# Patient Record
Sex: FEMALE | URBAN - METROPOLITAN AREA
[De-identification: names, ages, dates, MRNs, and addresses within clinical notes are randomized per-mention and may not be internally consistent; named-entity substitution may affect disease eponyms.]

---

## 2018-03-09 ENCOUNTER — RESOLUTE PROFESSIONAL BILLING HOSPITAL PROF FEE (OUTPATIENT)
Dept: HOSPITALIST | Facility: MEDICAL CENTER | Age: 48
End: 2018-03-09
Payer: MEDICAID

## 2018-03-09 ENCOUNTER — APPOINTMENT (OUTPATIENT)
Dept: RADIOLOGY | Facility: MEDICAL CENTER | Age: 48
DRG: 605 | End: 2018-03-09
Attending: EMERGENCY MEDICINE
Payer: MEDICAID

## 2018-03-09 ENCOUNTER — HOSPITAL ENCOUNTER (INPATIENT)
Facility: MEDICAL CENTER | Age: 48
LOS: 1 days | DRG: 605 | End: 2018-03-10
Attending: EMERGENCY MEDICINE | Admitting: HOSPITALIST
Payer: MEDICAID
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
## 2018-03-09 DIAGNOSIS — L03.113 CELLULITIS OF HAND, RIGHT: ICD-10-CM

## 2018-03-09 DIAGNOSIS — S61.451A DOG BITE OF RIGHT HAND, INITIAL ENCOUNTER: ICD-10-CM

## 2018-03-09 LAB
ALBUMIN SERPL BCP-MCNC: 3.7 G/DL (ref 3.2–4.9)
ALBUMIN/GLOB SERPL: 1.5 G/DL
ALP SERPL-CCNC: 38 U/L (ref 30–99)
ALT SERPL-CCNC: 8 U/L (ref 2–50)
ANION GAP SERPL CALC-SCNC: 8 MMOL/L (ref 0–11.9)
AST SERPL-CCNC: 14 U/L (ref 12–45)
BASOPHILS # BLD AUTO: 0.3 % (ref 0–1.8)
BASOPHILS # BLD: 0.03 K/UL (ref 0–0.12)
BILIRUB SERPL-MCNC: 0.2 MG/DL (ref 0.1–1.5)
BUN SERPL-MCNC: 11 MG/DL (ref 8–22)
CALCIUM SERPL-MCNC: 8.8 MG/DL (ref 8.5–10.5)
CHLORIDE SERPL-SCNC: 107 MMOL/L (ref 96–112)
CO2 SERPL-SCNC: 22 MMOL/L (ref 20–33)
CREAT SERPL-MCNC: 0.57 MG/DL (ref 0.5–1.4)
EOSINOPHIL # BLD AUTO: 0.03 K/UL (ref 0–0.51)
EOSINOPHIL NFR BLD: 0.3 % (ref 0–6.9)
ERYTHROCYTE [DISTWIDTH] IN BLOOD BY AUTOMATED COUNT: 49.1 FL (ref 35.9–50)
GLOBULIN SER CALC-MCNC: 2.4 G/DL (ref 1.9–3.5)
GLUCOSE SERPL-MCNC: 107 MG/DL (ref 65–99)
HCT VFR BLD AUTO: 37.6 % (ref 37–47)
HGB BLD-MCNC: 13 G/DL (ref 12–16)
IMM GRANULOCYTES # BLD AUTO: 0.03 K/UL (ref 0–0.11)
IMM GRANULOCYTES NFR BLD AUTO: 0.3 % (ref 0–0.9)
LYMPHOCYTES # BLD AUTO: 1.54 K/UL (ref 1–4.8)
LYMPHOCYTES NFR BLD: 15.2 % (ref 22–41)
MCH RBC QN AUTO: 33.8 PG (ref 27–33)
MCHC RBC AUTO-ENTMCNC: 34.6 G/DL (ref 33.6–35)
MCV RBC AUTO: 97.7 FL (ref 81.4–97.8)
MONOCYTES # BLD AUTO: 0.88 K/UL (ref 0–0.85)
MONOCYTES NFR BLD AUTO: 8.7 % (ref 0–13.4)
NEUTROPHILS # BLD AUTO: 7.61 K/UL (ref 2–7.15)
NEUTROPHILS NFR BLD: 75.2 % (ref 44–72)
NRBC # BLD AUTO: 0 K/UL
NRBC BLD-RTO: 0 /100 WBC
PLATELET # BLD AUTO: 212 K/UL (ref 164–446)
PMV BLD AUTO: 10.7 FL (ref 9–12.9)
POTASSIUM SERPL-SCNC: 4.2 MMOL/L (ref 3.6–5.5)
PROT SERPL-MCNC: 6.1 G/DL (ref 6–8.2)
RBC # BLD AUTO: 3.85 M/UL (ref 4.2–5.4)
SODIUM SERPL-SCNC: 137 MMOL/L (ref 135–145)
WBC # BLD AUTO: 10.1 K/UL (ref 4.8–10.8)

## 2018-03-09 PROCEDURE — 87040 BLOOD CULTURE FOR BACTERIA: CPT

## 2018-03-09 PROCEDURE — 700102 HCHG RX REV CODE 250 W/ 637 OVERRIDE(OP): Performed by: EMERGENCY MEDICINE

## 2018-03-09 PROCEDURE — 96366 THER/PROPH/DIAG IV INF ADDON: CPT

## 2018-03-09 PROCEDURE — 85025 COMPLETE CBC W/AUTO DIFF WBC: CPT

## 2018-03-09 PROCEDURE — 99223 1ST HOSP IP/OBS HIGH 75: CPT | Performed by: HOSPITALIST

## 2018-03-09 PROCEDURE — 700105 HCHG RX REV CODE 258: Performed by: EMERGENCY MEDICINE

## 2018-03-09 PROCEDURE — 770006 HCHG ROOM/CARE - MED/SURG/GYN SEMI*

## 2018-03-09 PROCEDURE — 36415 COLL VENOUS BLD VENIPUNCTURE: CPT

## 2018-03-09 PROCEDURE — 80053 COMPREHEN METABOLIC PANEL: CPT

## 2018-03-09 PROCEDURE — A9270 NON-COVERED ITEM OR SERVICE: HCPCS | Performed by: FAMILY MEDICINE

## 2018-03-09 PROCEDURE — 3E0234Z INTRODUCTION OF SERUM, TOXOID AND VACCINE INTO MUSCLE, PERCUTANEOUS APPROACH: ICD-10-PCS | Performed by: EMERGENCY MEDICINE

## 2018-03-09 PROCEDURE — 90715 TDAP VACCINE 7 YRS/> IM: CPT | Performed by: EMERGENCY MEDICINE

## 2018-03-09 PROCEDURE — 96365 THER/PROPH/DIAG IV INF INIT: CPT

## 2018-03-09 PROCEDURE — A9270 NON-COVERED ITEM OR SERVICE: HCPCS | Performed by: EMERGENCY MEDICINE

## 2018-03-09 PROCEDURE — 700102 HCHG RX REV CODE 250 W/ 637 OVERRIDE(OP): Performed by: FAMILY MEDICINE

## 2018-03-09 PROCEDURE — 700111 HCHG RX REV CODE 636 W/ 250 OVERRIDE (IP): Performed by: HOSPITALIST

## 2018-03-09 PROCEDURE — 99285 EMERGENCY DEPT VISIT HI MDM: CPT

## 2018-03-09 PROCEDURE — 700111 HCHG RX REV CODE 636 W/ 250 OVERRIDE (IP): Performed by: EMERGENCY MEDICINE

## 2018-03-09 PROCEDURE — 73130 X-RAY EXAM OF HAND: CPT | Mod: RT

## 2018-03-09 PROCEDURE — 700105 HCHG RX REV CODE 258: Performed by: HOSPITALIST

## 2018-03-09 PROCEDURE — 90471 IMMUNIZATION ADMIN: CPT

## 2018-03-09 RX ORDER — AMOXICILLIN AND CLAVULANATE POTASSIUM 875; 125 MG/1; MG/1
1 TABLET, FILM COATED ORAL 2 TIMES DAILY
COMMUNITY
Start: 2018-03-08

## 2018-03-09 RX ORDER — TEMAZEPAM 15 MG/1
15 CAPSULE ORAL
Status: DISCONTINUED | OUTPATIENT
Start: 2018-03-09 | End: 2018-03-10 | Stop reason: HOSPADM

## 2018-03-09 RX ORDER — PROMETHAZINE HYDROCHLORIDE 25 MG/1
12.5-25 TABLET ORAL EVERY 4 HOURS PRN
Status: DISCONTINUED | OUTPATIENT
Start: 2018-03-09 | End: 2018-03-10 | Stop reason: HOSPADM

## 2018-03-09 RX ORDER — MORPHINE SULFATE 4 MG/ML
2 INJECTION, SOLUTION INTRAMUSCULAR; INTRAVENOUS
Status: DISCONTINUED | OUTPATIENT
Start: 2018-03-09 | End: 2018-03-10 | Stop reason: HOSPADM

## 2018-03-09 RX ORDER — ACETAMINOPHEN 325 MG/1
650 TABLET ORAL EVERY 6 HOURS PRN
Status: DISCONTINUED | OUTPATIENT
Start: 2018-03-09 | End: 2018-03-10 | Stop reason: HOSPADM

## 2018-03-09 RX ORDER — FLUOXETINE HYDROCHLORIDE 20 MG/1
20 CAPSULE ORAL EVERY MORNING
COMMUNITY

## 2018-03-09 RX ORDER — PROMETHAZINE HYDROCHLORIDE 12.5 MG/1
12.5-25 SUPPOSITORY RECTAL EVERY 4 HOURS PRN
Status: DISCONTINUED | OUTPATIENT
Start: 2018-03-09 | End: 2018-03-10 | Stop reason: HOSPADM

## 2018-03-09 RX ORDER — ONDANSETRON 2 MG/ML
4 INJECTION INTRAMUSCULAR; INTRAVENOUS EVERY 4 HOURS PRN
Status: DISCONTINUED | OUTPATIENT
Start: 2018-03-09 | End: 2018-03-10 | Stop reason: HOSPADM

## 2018-03-09 RX ORDER — ANTIARTHRITIC COMBINATION NO.2 900 MG
6.25 TABLET ORAL DAILY
COMMUNITY

## 2018-03-09 RX ORDER — IBUPROFEN 600 MG/1
600 TABLET ORAL ONCE
Status: COMPLETED | OUTPATIENT
Start: 2018-03-09 | End: 2018-03-09

## 2018-03-09 RX ORDER — NICOTINE 21 MG/24HR
14 PATCH, TRANSDERMAL 24 HOURS TRANSDERMAL
Status: DISCONTINUED | OUTPATIENT
Start: 2018-03-10 | End: 2018-03-09

## 2018-03-09 RX ORDER — OXYCODONE HYDROCHLORIDE 5 MG/1
5 TABLET ORAL
Status: DISCONTINUED | OUTPATIENT
Start: 2018-03-09 | End: 2018-03-10 | Stop reason: HOSPADM

## 2018-03-09 RX ORDER — DIPHENHYDRAMINE HCL 25 MG
25 TABLET ORAL ONCE
Status: COMPLETED | OUTPATIENT
Start: 2018-03-09 | End: 2018-03-09

## 2018-03-09 RX ORDER — POLYETHYLENE GLYCOL 3350 17 G/17G
1 POWDER, FOR SOLUTION ORAL
Status: DISCONTINUED | OUTPATIENT
Start: 2018-03-09 | End: 2018-03-10 | Stop reason: HOSPADM

## 2018-03-09 RX ORDER — IBUPROFEN 600 MG/1
600 TABLET ORAL EVERY 6 HOURS PRN
Status: DISCONTINUED | OUTPATIENT
Start: 2018-03-09 | End: 2018-03-10 | Stop reason: HOSPADM

## 2018-03-09 RX ORDER — OXYCODONE HYDROCHLORIDE 5 MG/1
2.5 TABLET ORAL
Status: DISCONTINUED | OUTPATIENT
Start: 2018-03-09 | End: 2018-03-10 | Stop reason: HOSPADM

## 2018-03-09 RX ORDER — BISACODYL 10 MG
10 SUPPOSITORY, RECTAL RECTAL
Status: DISCONTINUED | OUTPATIENT
Start: 2018-03-09 | End: 2018-03-10 | Stop reason: HOSPADM

## 2018-03-09 RX ORDER — AMOXICILLIN 250 MG
2 CAPSULE ORAL 2 TIMES DAILY
Status: DISCONTINUED | OUTPATIENT
Start: 2018-03-09 | End: 2018-03-10 | Stop reason: HOSPADM

## 2018-03-09 RX ORDER — NICOTINE 21 MG/24HR
14 PATCH, TRANSDERMAL 24 HOURS TRANSDERMAL
Status: DISCONTINUED | OUTPATIENT
Start: 2018-03-09 | End: 2018-03-10 | Stop reason: HOSPADM

## 2018-03-09 RX ORDER — FLUOXETINE HYDROCHLORIDE 20 MG/1
20 CAPSULE ORAL EVERY MORNING
Status: DISCONTINUED | OUTPATIENT
Start: 2018-03-10 | End: 2018-03-10 | Stop reason: HOSPADM

## 2018-03-09 RX ORDER — ONDANSETRON 4 MG/1
4 TABLET, ORALLY DISINTEGRATING ORAL EVERY 4 HOURS PRN
Status: DISCONTINUED | OUTPATIENT
Start: 2018-03-09 | End: 2018-03-10 | Stop reason: HOSPADM

## 2018-03-09 RX ORDER — SODIUM CHLORIDE 9 MG/ML
INJECTION, SOLUTION INTRAVENOUS CONTINUOUS
Status: DISCONTINUED | OUTPATIENT
Start: 2018-03-09 | End: 2018-03-10 | Stop reason: HOSPADM

## 2018-03-09 RX ADMIN — AMPICILLIN SODIUM AND SULBACTAM SODIUM 3 G: 2; 1 INJECTION, POWDER, FOR SOLUTION INTRAMUSCULAR; INTRAVENOUS at 21:16

## 2018-03-09 RX ADMIN — DIPHENHYDRAMINE HCL 25 MG: 25 TABLET ORAL at 23:41

## 2018-03-09 RX ADMIN — IBUPROFEN 600 MG: 600 TABLET, FILM COATED ORAL at 19:40

## 2018-03-09 RX ADMIN — AMPICILLIN SODIUM AND SULBACTAM SODIUM 3 G: 2; 1 INJECTION, POWDER, FOR SOLUTION INTRAMUSCULAR; INTRAVENOUS at 15:45

## 2018-03-09 RX ADMIN — SODIUM CHLORIDE: 9 INJECTION, SOLUTION INTRAVENOUS at 21:03

## 2018-03-09 RX ADMIN — CLOSTRIDIUM TETANI TOXOID ANTIGEN (FORMALDEHYDE INACTIVATED), CORYNEBACTERIUM DIPHTHERIAE TOXOID ANTIGEN (FORMALDEHYDE INACTIVATED), BORDETELLA PERTUSSIS TOXOID ANTIGEN (GLUTARALDEHYDE INACTIVATED), BORDETELLA PERTUSSIS FILAMENTOUS HEMAGGLUTININ ANTIGEN (FORMALDEHYDE INACTIVATED), BORDETELLA PERTUSSIS PERTACTIN ANTIGEN, AND BORDETELLA PERTUSSIS FIMBRIAE 2/3 ANTIGEN 0.5 ML: 5; 2; 2.5; 5; 3; 5 INJECTION, SUSPENSION INTRAMUSCULAR at 15:45

## 2018-03-09 RX ADMIN — NICOTINE 14 MG: 14 PATCH, EXTENDED RELEASE TRANSDERMAL at 17:40

## 2018-03-09 ASSESSMENT — PATIENT HEALTH QUESTIONNAIRE - PHQ9
SUM OF ALL RESPONSES TO PHQ9 QUESTIONS 1 AND 2: 0
1. LITTLE INTEREST OR PLEASURE IN DOING THINGS: NOT AT ALL
SUM OF ALL RESPONSES TO PHQ QUESTIONS 1-9: 0
2. FEELING DOWN, DEPRESSED, IRRITABLE, OR HOPELESS: NOT AT ALL

## 2018-03-09 ASSESSMENT — LIFESTYLE VARIABLES
HOW MANY TIMES IN THE PAST YEAR HAVE YOU HAD 5 OR MORE DRINKS IN A DAY: 0
TOTAL SCORE: 0
HAVE PEOPLE ANNOYED YOU BY CRITICIZING YOUR DRINKING: NO
EVER FELT BAD OR GUILTY ABOUT YOUR DRINKING: NO
ON A TYPICAL DAY WHEN YOU DRINK ALCOHOL HOW MANY DRINKS DO YOU HAVE: 2
TOTAL SCORE: 0
CONSUMPTION TOTAL: POSITIVE
AVERAGE NUMBER OF DAYS PER WEEK YOU HAVE A DRINK CONTAINING ALCOHOL: 4
EVER HAD A DRINK FIRST THING IN THE MORNING TO STEADY YOUR NERVES TO GET RID OF A HANGOVER: NO
EVER_SMOKED: YES
TOTAL SCORE: 0
ALCOHOL_USE: YES
HAVE YOU EVER FELT YOU SHOULD CUT DOWN ON YOUR DRINKING: NO

## 2018-03-09 ASSESSMENT — PAIN SCALES - GENERAL
PAINLEVEL_OUTOF10: 4

## 2018-03-09 NOTE — ED TRIAGE NOTES
.  Chief Complaint   Patient presents with   • Dog Bite     3 days ago right thumb and palm.    • Hand Swelling     increasing    • Sent by MD     recieved IM abx yesterday, redness and swelling increasing.   ambulated to triage with above c.c.

## 2018-03-09 NOTE — ED PROVIDER NOTES
"ED Provider Note    CHIEF COMPLAINT  Chief Complaint   Patient presents with   • Dog Bite     3 days ago right thumb and palm.    • Hand Swelling     increasing    • Sent by MD     recieved IM abx yesterday, redness and swelling increasing.       HPI  Ana Lane is a 47 y.o. female who presents for evaluation of a dog bite to the right hand, she is right-hand-dominant, she is a caretaker for an elderly residual, she reports being bitten by her own dog who is up-to-date on shots 2 days ago. Went to the PCP yesterday, received an IM injection of antibiotics yesterday, was started on Augmentin yesterday and has had 2 doses, reports the redness, pain and swelling is extending proximally. No weakness or numbness but does report tingling in all of her fingers. The bite occurred on the radial aspect of the hand. No diabetes, she denies having a fever, there is no nausea or vomiting and no other specific complaints.    REVIEW OF SYSTEMS  Negative for fever, vomiting, weakness, numbness.     PAST MEDICAL HISTORY       SOCIAL HISTORY  Social History     Social History Main Topics   • Smoking status: Not on file   • Smokeless tobacco: Not on file   • Alcohol use Not on file   • Drug use: Unknown   • Sexual activity: Not on file       SURGICAL HISTORY  patient denies any surgical history    CURRENT MEDICATIONS  I personally reviewed the medication list in the charting documentation.     ALLERGIES  No Known Allergies    PHYSICAL EXAM  VITAL SIGNS: /73   Pulse 86   Temp 36.4 °C (97.6 °F)   Resp 16   Ht 1.651 m (5' 5\")   Wt 54.7 kg (120 lb 9.5 oz)   SpO2 98%   BMI 20.07 kg/m²   Constitutional: Alert in no apparent distress.  HENT: No signs of trauma.   Eyes: Conjunctiva normal, Non-icteric.   Chest: Normal nonlabored respirations  Skin: No erythema, No rash.   Musculoskeletal: Edema and erythema of the right hand more pronounced on the radial aspect, the proximal extension in his midforearm, no appreciating " fluctuance, there are a few small puncture wounds, she has intact sensation in all 3 nerve distributions. Good radial pulse.  Neurologic: Alert, No focal deficits noted.   Psychiatric: Affect normal, Judgment normal.    DIAGNOSTIC STUDIES / PROCEDURES    LABS  Results for orders placed or performed during the hospital encounter of 03/09/18   CBC WITH DIFFERENTIAL   Result Value Ref Range    WBC 10.1 4.8 - 10.8 K/uL    RBC 3.85 (L) 4.20 - 5.40 M/uL    Hemoglobin 13.0 12.0 - 16.0 g/dL    Hematocrit 37.6 37.0 - 47.0 %    MCV 97.7 81.4 - 97.8 fL    MCH 33.8 (H) 27.0 - 33.0 pg    MCHC 34.6 33.6 - 35.0 g/dL    RDW 49.1 35.9 - 50.0 fL    Platelet Count 212 164 - 446 K/uL    MPV 10.7 9.0 - 12.9 fL    Neutrophils-Polys 75.20 (H) 44.00 - 72.00 %    Lymphocytes 15.20 (L) 22.00 - 41.00 %    Monocytes 8.70 0.00 - 13.40 %    Eosinophils 0.30 0.00 - 6.90 %    Basophils 0.30 0.00 - 1.80 %    Immature Granulocytes 0.30 0.00 - 0.90 %    Nucleated RBC 0.00 /100 WBC    Neutrophils (Absolute) 7.61 (H) 2.00 - 7.15 K/uL    Lymphs (Absolute) 1.54 1.00 - 4.80 K/uL    Monos (Absolute) 0.88 (H) 0.00 - 0.85 K/uL    Eos (Absolute) 0.03 0.00 - 0.51 K/uL    Baso (Absolute) 0.03 0.00 - 0.12 K/uL    Immature Granulocytes (abs) 0.03 0.00 - 0.11 K/uL    NRBC (Absolute) 0.00 K/uL   COMP METABOLIC PANEL   Result Value Ref Range    Sodium 137 135 - 145 mmol/L    Potassium 4.2 3.6 - 5.5 mmol/L    Chloride 107 96 - 112 mmol/L    Co2 22 20 - 33 mmol/L    Anion Gap 8.0 0.0 - 11.9    Glucose 107 (H) 65 - 99 mg/dL    Bun 11 8 - 22 mg/dL    Creatinine 0.57 0.50 - 1.40 mg/dL    Calcium 8.8 8.5 - 10.5 mg/dL    AST(SGOT) 14 12 - 45 U/L    ALT(SGPT) 8 2 - 50 U/L    Alkaline Phosphatase 38 30 - 99 U/L    Total Bilirubin 0.2 0.1 - 1.5 mg/dL    Albumin 3.7 3.2 - 4.9 g/dL    Total Protein 6.1 6.0 - 8.2 g/dL    Globulin 2.4 1.9 - 3.5 g/dL    A-G Ratio 1.5 g/dL   ESTIMATED GFR   Result Value Ref Range    GFR If African American >60 >60 mL/min/1.73 m 2    GFR If Non  African American >60 >60 mL/min/1.73 m 2         RADIOLOGY  DX-HAND 3+ RIGHT   Final Result      Soft tissue swelling. No acute fracture identified.            COURSE & MEDICAL DECISION MAKING  Pertinent Labs & Imaging studies reviewed. (See chart for details)    Encounter Summary: This is a 47 y.o. female with a cellulitis of her dominant hand on day 3 status post a dog bite, she's been on Augmentin and continues to worsen, neurovascularly intact, I think at this point stepping up her antibiotic treatment with IV antibiotics is prudent, will check blood work, and x-ray as well and ultimately she'll be admitted to the hospital for further evaluation.      DISPOSITION: Admitted in guarded condition      FINAL IMPRESSION  1. Dog bite of right hand, initial encounter    2. Cellulitis of hand, right        This dictation was created using voice recognition software. The accuracy of the dictation is limited to the abilities of the software. I expect there may be some errors of grammar and possibly content. The nursing notes were reviewed and certain aspects of this information were incorporated into this note.    Electronically signed by: Georges De, 3/9/2018 2:35 PM

## 2018-03-10 VITALS
OXYGEN SATURATION: 100 % | SYSTOLIC BLOOD PRESSURE: 127 MMHG | HEIGHT: 65 IN | WEIGHT: 120.59 LBS | RESPIRATION RATE: 16 BRPM | DIASTOLIC BLOOD PRESSURE: 83 MMHG | TEMPERATURE: 99.1 F | BODY MASS INDEX: 20.09 KG/M2 | HEART RATE: 78 BPM

## 2018-03-10 LAB
ALBUMIN SERPL BCP-MCNC: 3.2 G/DL (ref 3.2–4.9)
ALBUMIN/GLOB SERPL: 1.5 G/DL
ALP SERPL-CCNC: 31 U/L (ref 30–99)
ALT SERPL-CCNC: 7 U/L (ref 2–50)
ANION GAP SERPL CALC-SCNC: 3 MMOL/L (ref 0–11.9)
AST SERPL-CCNC: 11 U/L (ref 12–45)
BASOPHILS # BLD AUTO: 0.4 % (ref 0–1.8)
BASOPHILS # BLD: 0.03 K/UL (ref 0–0.12)
BILIRUB SERPL-MCNC: 0.2 MG/DL (ref 0.1–1.5)
BUN SERPL-MCNC: 10 MG/DL (ref 8–22)
CALCIUM SERPL-MCNC: 8.6 MG/DL (ref 8.5–10.5)
CHLORIDE SERPL-SCNC: 113 MMOL/L (ref 96–112)
CO2 SERPL-SCNC: 23 MMOL/L (ref 20–33)
CREAT SERPL-MCNC: 0.55 MG/DL (ref 0.5–1.4)
EOSINOPHIL # BLD AUTO: 0.08 K/UL (ref 0–0.51)
EOSINOPHIL NFR BLD: 1 % (ref 0–6.9)
ERYTHROCYTE [DISTWIDTH] IN BLOOD BY AUTOMATED COUNT: 50.9 FL (ref 35.9–50)
GLOBULIN SER CALC-MCNC: 2.2 G/DL (ref 1.9–3.5)
GLUCOSE SERPL-MCNC: 92 MG/DL (ref 65–99)
HCT VFR BLD AUTO: 37.9 % (ref 37–47)
HGB BLD-MCNC: 12.5 G/DL (ref 12–16)
IMM GRANULOCYTES # BLD AUTO: 0.02 K/UL (ref 0–0.11)
IMM GRANULOCYTES NFR BLD AUTO: 0.2 % (ref 0–0.9)
LYMPHOCYTES # BLD AUTO: 2.11 K/UL (ref 1–4.8)
LYMPHOCYTES NFR BLD: 26.3 % (ref 22–41)
MCH RBC QN AUTO: 32.6 PG (ref 27–33)
MCHC RBC AUTO-ENTMCNC: 33 G/DL (ref 33.6–35)
MCV RBC AUTO: 99 FL (ref 81.4–97.8)
MONOCYTES # BLD AUTO: 0.81 K/UL (ref 0–0.85)
MONOCYTES NFR BLD AUTO: 10.1 % (ref 0–13.4)
NEUTROPHILS # BLD AUTO: 4.96 K/UL (ref 2–7.15)
NEUTROPHILS NFR BLD: 62 % (ref 44–72)
NRBC # BLD AUTO: 0 K/UL
NRBC BLD-RTO: 0 /100 WBC
PLATELET # BLD AUTO: 194 K/UL (ref 164–446)
PMV BLD AUTO: 10.9 FL (ref 9–12.9)
POTASSIUM SERPL-SCNC: 4 MMOL/L (ref 3.6–5.5)
PROT SERPL-MCNC: 5.4 G/DL (ref 6–8.2)
RBC # BLD AUTO: 3.83 M/UL (ref 4.2–5.4)
SODIUM SERPL-SCNC: 139 MMOL/L (ref 135–145)
WBC # BLD AUTO: 8 K/UL (ref 4.8–10.8)

## 2018-03-10 PROCEDURE — 700102 HCHG RX REV CODE 250 W/ 637 OVERRIDE(OP): Performed by: EMERGENCY MEDICINE

## 2018-03-10 PROCEDURE — 36415 COLL VENOUS BLD VENIPUNCTURE: CPT

## 2018-03-10 PROCEDURE — 85025 COMPLETE CBC W/AUTO DIFF WBC: CPT

## 2018-03-10 PROCEDURE — 700102 HCHG RX REV CODE 250 W/ 637 OVERRIDE(OP): Performed by: HOSPITALIST

## 2018-03-10 PROCEDURE — 700105 HCHG RX REV CODE 258: Performed by: HOSPITALIST

## 2018-03-10 PROCEDURE — 700111 HCHG RX REV CODE 636 W/ 250 OVERRIDE (IP): Performed by: HOSPITALIST

## 2018-03-10 PROCEDURE — 80053 COMPREHEN METABOLIC PANEL: CPT

## 2018-03-10 PROCEDURE — A9270 NON-COVERED ITEM OR SERVICE: HCPCS | Performed by: EMERGENCY MEDICINE

## 2018-03-10 PROCEDURE — A9270 NON-COVERED ITEM OR SERVICE: HCPCS | Performed by: HOSPITALIST

## 2018-03-10 PROCEDURE — 99239 HOSP IP/OBS DSCHRG MGMT >30: CPT | Performed by: HOSPITALIST

## 2018-03-10 PROCEDURE — 700102 HCHG RX REV CODE 250 W/ 637 OVERRIDE(OP): Performed by: FAMILY MEDICINE

## 2018-03-10 PROCEDURE — A9270 NON-COVERED ITEM OR SERVICE: HCPCS | Performed by: FAMILY MEDICINE

## 2018-03-10 RX ADMIN — IBUPROFEN 600 MG: 600 TABLET, FILM COATED ORAL at 04:37

## 2018-03-10 RX ADMIN — NICOTINE 14 MG: 14 PATCH, EXTENDED RELEASE TRANSDERMAL at 06:04

## 2018-03-10 RX ADMIN — SODIUM CHLORIDE: 9 INJECTION, SOLUTION INTRAVENOUS at 08:03

## 2018-03-10 RX ADMIN — AMPICILLIN SODIUM AND SULBACTAM SODIUM 3 G: 2; 1 INJECTION, POWDER, FOR SOLUTION INTRAMUSCULAR; INTRAVENOUS at 04:37

## 2018-03-10 RX ADMIN — FLUOXETINE HYDROCHLORIDE 20 MG: 20 CAPSULE ORAL at 08:13

## 2018-03-10 RX ADMIN — AMPICILLIN SODIUM AND SULBACTAM SODIUM 3 G: 2; 1 INJECTION, POWDER, FOR SOLUTION INTRAMUSCULAR; INTRAVENOUS at 10:52

## 2018-03-10 ASSESSMENT — PAIN SCALES - GENERAL: PAINLEVEL_OUTOF10: 3

## 2018-03-10 NOTE — ED NOTES
Pt updated on POC - awaiting admission. Pt requesting to go outside and smoke, received order for nicotine patch

## 2018-03-10 NOTE — PROGRESS NOTES
Received report from Terrell Martinez.  Pt. Is alert, awake, and oriented to self, time, date, and place. Pt. Is lying semi-posada's position, non labored breathing on room air.  Pt. States her hand feels less tight and no numbness noted-tingling on five fingers noted. Right hand swelling, with no open wounds noted, warm to touch.   Puncture wound on thumb/wrist area is healed and scabbed.  Right antecubital IV dressing: clean, dry, and intact.  NS at 83 ml/hr infusing.  Explained to patient plan of care.  Bed in lowest position. Call light and belongings within reach. Will continue to monitor.

## 2018-03-10 NOTE — H&P
DATE OF ADMISSION:  2018    IDENTIFICATION:  A 47-year-old female.    PRIMARY CARE PHYSICIAN:  Dr. Fred Pérez.    CHIEF COMPLAINT:  Dog bite approximately 3 days ago.    HISTORY OF PRESENT ILLNESS:  This is a 47-year-old female with a history of   depression and tobacco use who got bit by her own dog, a [place]ua that   apparently got scared and bit her into her right hand.  The patient presents   to the emergency room after she was treated as an outpatient with Rocephin IM   and following Augmentin, but it did lead to not much improvement with   increasing erythema going up her arm and increasing pain.  The patient is   evaluated in the emergency room.  She does have some left shift and   neutrophils that are increased.  She does have significant redness, swelling,   pain at the puncture sites and going up into her thenar area of her right hand   with some erythema reaching up into her upper right forearm.  Her x-ray was   here negative.  There is no current evidence of abscess formation.  Patient to   be admitted for IV antibiotics and further care.    ALLERGIES:  None.    PRESCRIPTION MEDICATIONS:  As an outpatient as follows:  1.  Augmentin 1 tablet p.o. b.i.d.  2.  DHEA 25 mg, which she takes 1/4 tablet daily.  3.  Primrose oil p.o. daily at night.  4.  Flaxseed oil daily.  5.  Prozac 20 mg daily.    PAST MEDICAL HISTORY:  Depression.    PAST SURGICAL HISTORY:   and wisdom teeth.    SOCIAL HISTORY:  The patient smokes 3 cigarettes daily.  She drinks wine 2   glasses daily.  She denies drug use.  She is single.    FAMILY HISTORY:  Positive for stroke in her maternal grandmother.    REVIEW OF SYSTEMS:  Negative per the AMA and CMS criteria other than outlined   in the HPI.  She denies any fevers or chills.    PHYSICAL EXAMINATION:  VITAL SIGNS:  The patient is found with temperature 36.4, pulse 86,   respirations 16, blood pressure 127/73.  Patient is saturating 91% on room   air.  GENERAL:   This is a pleasant  female in no acute distress, no acute   respiratory distress.  Well developed, well nourished.  HEENT:  Normocephalic, atraumatic.  EOMI.  PERRLA.  Mucous membranes are   moist.  Nasopharynx otherwise clear.  NECK:  Stiff range of motion.  No lymphadenopathy or thyromegaly.  CHEST:  Normal bony structures.  LUNGS:  Clear to auscultation anteriorly.  HEART:  Regular rate.  S1 and S2 are normal.  No S3, S4.  ABDOMEN:  Protuberant.  There is no tenderness, no distention.  Positive bowel   sounds in all 4 quadrants.  No hepatosplenomegaly appreciated.  GENITOURINARY:  Normal external female genitalia.  RECTAL:  Deferred.  MUSCULOSKELETAL:  No clubbing, cyanosis or edema except for the right upper   extremity where she has significant edema around thumb base joint with   significant swelling, edema, erythema.  Puncture sites are without drainage.    There is erythema reaching up into her right forearm.  There is no   lymphadenopathy felt.  There is no per se lymphangitic streaking currently   seen.  There is significant tenderness.  Range of motion is okay as well as   sensation.    LABORATORY DATA AND IMAGING:  Abnormals only.  The patient does have an MCV of   97.7 and a neutrophil count of 75.2, decreased lymphocytes, increased   monocytes.  Glucose 107, otherwise normal.  Hand x-ray shows no fracture.    There was some soft tissue edema.    ASSESSMENT AND PLAN:  1.  Dog bite with right upper extremity cellulitis and possible beginning   lymphadenitis.  The patient will be placed on Unasyn.  She will be hydrated.    She will be monitored clinically with IV antibiotic use since she failed   outpatient p.o. antibiotics.  2.  Dog bite.  Apparently, the patient has been appropriately vaccinated.  3.  History of depression.  We will continue her Prozac.  4.  Tobacco use.  5.  Alcohol use.    The patient at this time will be admitted for right upper extremity cellulitis   from a dog bite, which  failed outpatient therapy and the patient is in need   of IV antibiotics, IV hydration requiring likely 2+ overnights for significant   improvement to be discharged.       ____________________________________     MD CHRISTOPHER BO / CATHRYN    DD:  03/09/2018 20:11:51  DT:  03/09/2018 22:20:19    D#:  [id number]  Job#:  [id number]

## 2018-03-10 NOTE — PROGRESS NOTES
2 RN skin check completed with Terrell Shin. There is only one small scab to mid back noted. No other skin concerns.

## 2018-03-10 NOTE — ED NOTES
Med rec complete per pt at bedside  Allergies reviewed - NKDA  Pt started a course of Augmentin yesterday. Has taken 2 doses so far  Also received IM ABX while at doctor's office yesterday. Possibly Rocephin, but pt is unsure

## 2018-03-10 NOTE — PROGRESS NOTES
Received report from ED RN and assumed care when patient arrived to the floor. Patient is A+Ox4, pleasant and cooperative. She is here with a dog bite to her right hand that is still swollen and will not go down. She had been taking augmentin at home for 5 days and nothing is improving. Her right hand is swollen and is numb and tingly. She is not able to make a fist with her right hand. You can see the puncture marks but they are scabbed over and not pink or red. IV antibiotics have been restarted and IV fluids as well. She has a regular diet. She only wants to take motrin for pain and is requesting something light for sleep. MD called and medications ordered. Patient is up self. No bed alarm required. Bed is locked and in lowest position, call light within reach and personal possessions within reach. Hourly rounding in place.

## 2018-03-10 NOTE — PROGRESS NOTES
Educated patient on discharge information including need to call for an appointment with PCP within one week, need to finish Augmentin dose prescribed to patient prior to admission, s&s of when to go to the emergency room, patient verbalized understanding.  IV removed with tip intact.  Patient ambulated down the unit in stable condition with all of patient's belongings.

## 2018-03-10 NOTE — DISCHARGE INSTRUCTIONS
Discharge Instructions    Discharged to home by car with relative. Discharged via walking, hospital escort: Yes.  Special equipment needed: Not Applicable    Be sure to schedule a follow-up appointment with your primary care doctor or any specialists as instructed.     Discharge Plan:   Diet Plan: Discussed  Activity Level: Discussed  Smoking Cessation Offered: Patient Counseled  Confirmed Symptoms Management: Discussed  Medication Reconciliation Updated: Yes  Influenza Vaccine Indication: Not indicated: Previously immunized this influenza season and > 8 years of age    I understand that a diet low in cholesterol, fat, and sodium is recommended for good health. Unless I have been given specific instructions below for another diet, I accept this instruction as my diet prescription.   Other diet: Regular    Special Instructions: None    · Is patient discharged on Warfarin / Coumadin?   No       Cellulitis, Adult  Introduction  Cellulitis is a skin infection. The infected area is usually red and sore. This condition occurs most often in the arms and lower legs. It is very important to get treated for this condition.  Follow these instructions at home:  · Take over-the-counter and prescription medicines only as told by your doctor.  · If you were prescribed an antibiotic medicine, take it as told by your doctor. Do not stop taking the antibiotic even if you start to feel better.  · Drink enough fluid to keep your pee (urine) clear or pale yellow.  · Do not touch or rub the infected area.  · Raise (elevate) the infected area above the level of your heart while you are sitting or lying down.  · Place warm or cold wet cloths (warm or cold compresses) on the infected area. Do this as told by your doctor.  · Keep all follow-up visits as told by your doctor. This is important. These visits let your doctor make sure your infection is not getting worse.  Contact a doctor if:  · You have a fever.  · Your symptoms do not get  better after 1-2 days of treatment.  · Your bone or joint under the infected area starts to hurt after the skin has healed.  · Your infection comes back. This can happen in the same area or another area.  · You have a swollen bump in the infected area.  · You have new symptoms.  · You feel ill and also have muscle aches and pains.  Get help right away if:  · Your symptoms get worse.  · You feel very sleepy.  · You throw up (vomit) or have watery poop (diarrhea) for a long time.  · There are red streaks coming from the infected area.  · Your red area gets larger.  · Your red area turns darker.  This information is not intended to replace advice given to you by your health care provider. Make sure you discuss any questions you have with your health care provider.  Document Released: 06/05/2009 Document Revised: 05/25/2017 Document Reviewed: 10/26/2016  © 2017 Elsevier    Amoxicillin; Clavulanic Acid tablets  What is this medicine?  AMOXICILLIN; CLAVULANIC ACID (a mox i ZOHAIB in; TONG garcia AS id) is a penicillin antibiotic. It is used to treat certain kinds of bacterial infections. It will not work for colds, flu, or other viral infections.  This medicine may be used for other purposes; ask your health care provider or pharmacist if you have questions.  COMMON BRAND NAME(S): Augmentin  What should I tell my health care provider before I take this medicine?  They need to know if you have any of these conditions:  -bowel disease, like colitis  -kidney disease  -liver disease  -mononucleosis  -an unusual or allergic reaction to amoxicillin, penicillin, cephalosporin, other antibiotics, clavulanic acid, other medicines, foods, dyes, or preservatives  -pregnant or trying to get pregnant  -breast-feeding  How should I use this medicine?  Take this medicine by mouth with a full glass of water. Follow the directions on the prescription label. Take at the start of a meal. Do not crush or chew. If the tablet has a score  line, you may cut it in half at the score line for easier swallowing. Take your medicine at regular intervals. Do not take your medicine more often than directed. Take all of your medicine as directed even if you think you are better. Do not skip doses or stop your medicine early.  Talk to your pediatrician regarding the use of this medicine in children. Special care may be needed.  Overdosage: If you think you have taken too much of this medicine contact a poison control center or emergency room at once.  NOTE: This medicine is only for you. Do not share this medicine with others.  What if I miss a dose?  If you miss a dose, take it as soon as you can. If it is almost time for your next dose, take only that dose. Do not take double or extra doses.  What may interact with this medicine?  -allopurinol  -anticoagulants  -birth control pills  -methotrexate  -probenecid  This list may not describe all possible interactions. Give your health care provider a list of all the medicines, herbs, non-prescription drugs, or dietary supplements you use. Also tell them if you smoke, drink alcohol, or use illegal drugs. Some items may interact with your medicine.  What should I watch for while using this medicine?  Tell your doctor or health care professional if your symptoms do not improve.  Do not treat diarrhea with over the counter products. Contact your doctor if you have diarrhea that lasts more than 2 days or if it is severe and watery.  If you have diabetes, you may get a false-positive result for sugar in your urine. Check with your doctor or health care professional.  Birth control pills may not work properly while you are taking this medicine. Talk to your doctor about using an extra method of birth control.  What side effects may I notice from receiving this medicine?  Side effects that you should report to your doctor or health care professional as soon as possible:  -allergic reactions like skin rash, itching or  hives, swelling of the face, lips, or tongue  -breathing problems  -dark urine  -fever or chills, sore throat  -redness, blistering, peeling or loosening of the skin, including inside the mouth  -seizures  -trouble passing urine or change in the amount of urine  -unusual bleeding, bruising  -unusually weak or tired  -white patches or sores in the mouth or throat  Side effects that usually do not require medical attention (report to your doctor or health care professional if they continue or are bothersome):  -diarrhea  -dizziness  -headache  -nausea, vomiting  -stomach upset  -vaginal or anal irritation  This list may not describe all possible side effects. Call your doctor for medical advice about side effects. You may report side effects to FDA at 4-927-FDA-2177.  Where should I keep my medicine?  Keep out of the reach of children.  Store at room temperature below 25 degrees C (77 degrees F). Keep container tightly closed. Throw away any unused medicine after the expiration date.  NOTE: This sheet is a summary. It may not cover all possible information. If you have questions about this medicine, talk to your doctor, pharmacist, or health care provider.  © 2018 Elsevier/Gold Standard (2009-03-12 12:04:30)    Depression / Suicide Risk    As you are discharged from this [facility] facility, it is important to learn how to keep safe from harming yourself.    Recognize the warning signs:  · Abrupt changes in personality, positive or negative- including increase in energy   · Giving away possessions  · Change in eating patterns- significant weight changes-  positive or negative  · Change in sleeping patterns- unable to sleep or sleeping all the time   · Unwillingness or inability to communicate  · Depression  · Unusual sadness, discouragement and loneliness  · Talk of wanting to die  · Neglect of personal appearance   · Rebelliousness- reckless behavior  · Withdrawal from people/activities they love  · Confusion-  inability to concentrate     If you or a loved one observes any of these behaviors or has concerns about self-harm, here's what you can do:  · Talk about it- your feelings and reasons for harming yourself  · Remove any means that you might use to hurt yourself (examples: pills, rope, extension cords, firearm)  · Get professional help from the community (Mental Health, Substance Abuse, psychological counseling)  · Do not be alone:Call your Safe Contact- someone whom you trust who will be there for you.  · Call your local CRISIS HOTLINE 734-2743 or 349-633-7545  · Call your local Children's Mobile Crisis Response Team [place] (034) 619-4521 or www.PowerDsine  · Call the toll free National Suicide Prevention Hotlines   · National Suicide Prevention Lifeline 115-088-NOFQ (2265)  · National Hope Line Network 800-SUICIDE (666-1285)

## 2018-03-10 NOTE — CARE PLAN
Problem: Infection  Goal: Will remain free from infection  Outcome: PROGRESSING AS EXPECTED  Patient is on IV ABX to help prevent infection in right hand    Problem: Pain Management  Goal: Pain level will decrease to patient's comfort goal  Outcome: PROGRESSING AS EXPECTED  Patient stating motrin helps to keep pain tolerable and does not want to take anything stronger.

## 2018-03-11 NOTE — DISCHARGE SUMMARY
Hospital Medicine Discharge Note     Admit Date:  3/9/2018       Discharge Date:   3/10/2018    Attending Physician:  Maxime Whitney     Diagnoses (includes active and resolved):   1.  Dog bite with right upper extremity cellulitis and possible beginning   lymphadenitis.  2.  Dog bite.  Apparently, the dog has been appropriately vaccinated.  3.  History of depression.    4.  Tobacco use.  5.  Alcohol use.    Hospital Summary (Brief Narrative):       47-year-old female w/h/o depression and tobacco use who got bit by her own dog, a [place]ichris[place]ua that apparently got scared and bit her into her right hand. She was previously seen outpatient and got a Rocephin shot and then went home with oral Augmentin but continued to have increasing erythema and pain. She returned to the ER and was admitted for further infection and swelling.  She was started on IV Unasyn. X-ray was unremarkable. Patient then improved remarkably the next day and the pain also improved. Patient elected to go home and finish her oral antibiotic course.  The patient recovered much more quickly than anticipated on admission.     Consultants:      None    Procedures:        None    Discharge Medications:           Medication List      CONTINUE taking these medications      Instructions   amoxicillin-clavulanate 875-125 MG Tabs  Commonly known as:  AUGMENTIN   Take 1 Tab by mouth 2 times a day. 7 day course started 3/9/18  Dose:  1 Tab     DHEA 25 MG Tabs   Take 6.25 mg by mouth every day.  Dose:  6.25 mg     EVENING PRIMROSE OIL PO   Take 1 Cap by mouth every day.  Dose:  1 Cap     FLAX SEED OIL PO   Take 1 Cap by mouth every day.  Dose:  1 Cap     FLUoxetine 20 MG Caps  Commonly known as:  PROZAC   Take 20 mg by mouth every morning.  Dose:  20 mg          Disposition:   Discharge home    Diet:   Regular    Activity:   As tolerated    Code status:   Full code    Primary Care Provider:    No primary care provider on file.    Follow up appointment details :             Follow up with PCP in one week    No future appointments.    Pending Studies:        None    Time spent on discharge day patient visit: 42 minutes    #################################################    Interval history/exam for day of discharge:    Vitals:    03/09/18 2142 03/10/18 0450 03/10/18 0750 03/10/18 1411   BP: 117/73 138/87 126/82 127/83   Pulse: 63 (!) 52 69 78   Resp: 16 16 18 16   Temp: 36.9 °C (98.4 °F) 36.4 °C (97.5 °F) 36.8 °C (98.2 °F) 37.3 °C (99.1 °F)   SpO2: 97% 97% 98% 100%   Weight:       Height:         Weight/BMI: Body mass index is 20.07 kg/m².  Pulse Oximetry: 100 %, O2 (LPM): 0, O2 Delivery: None (Room Air)    General:  NAD  CVS:  RRR  PULM:  CTAB, no respiratory distress  Ext: Right hand swelling and pain improved but still present    Most Recent Labs:    Lab Results   Component Value Date/Time    WBC 8.0 03/10/2018 02:35 AM    RBC 3.83 (L) 03/10/2018 02:35 AM    HEMOGLOBIN 12.5 03/10/2018 02:35 AM    HEMATOCRIT 37.9 03/10/2018 02:35 AM    MCV 99.0 (H) 03/10/2018 02:35 AM    MCH 32.6 03/10/2018 02:35 AM    MCHC 33.0 (L) 03/10/2018 02:35 AM    MPV 10.9 03/10/2018 02:35 AM    NEUTSPOLYS 62.00 03/10/2018 02:35 AM    LYMPHOCYTES 26.30 03/10/2018 02:35 AM    MONOCYTES 10.10 03/10/2018 02:35 AM    EOSINOPHILS 1.00 03/10/2018 02:35 AM    BASOPHILS 0.40 03/10/2018 02:35 AM      Lab Results   Component Value Date/Time    SODIUM 139 03/10/2018 02:35 AM    POTASSIUM 4.0 03/10/2018 02:35 AM    CHLORIDE 113 (H) 03/10/2018 02:35 AM    CO2 23 03/10/2018 02:35 AM    GLUCOSE 92 03/10/2018 02:35 AM    BUN 10 03/10/2018 02:35 AM    CREATININE 0.55 03/10/2018 02:35 AM      Lab Results   Component Value Date/Time    ALTSGPT 7 03/10/2018 02:35 AM    ASTSGOT 11 (L) 03/10/2018 02:35 AM    ALKPHOSPHAT 31 03/10/2018 02:35 AM    TBILIRUBIN 0.2 03/10/2018 02:35 AM    ALBUMIN 3.2 03/10/2018 02:35 AM    GLOBULIN 2.2 03/10/2018 02:35 AM     No results found for: PROTHROMBTM, INR     Imaging/ Testing:       DX-HAND 3+ RIGHT   Final Result      Soft tissue swelling. No acute fracture identified.          Instructions:      The patient was instructed to return to the ER in the event of worsening symptoms. I have counseled the patient on the importance of compliance and the patient has agreed to proceed with all medical recommendations and follow up plan indicated above.   The patient understands that all medications come with benefits and risks. Risks may include permanent injury or death and these risks can be minimized with close reassessment and monitoring.

## 2018-03-14 LAB
BACTERIA BLD CULT: NORMAL
BACTERIA BLD CULT: NORMAL
SIGNIFICANT IND 70042: NORMAL
SIGNIFICANT IND 70042: NORMAL
SITE SITE: NORMAL
SITE SITE: NORMAL
SOURCE SOURCE: NORMAL
SOURCE SOURCE: NORMAL